# Patient Record
Sex: FEMALE | Race: WHITE | NOT HISPANIC OR LATINO | URBAN - NONMETROPOLITAN AREA
[De-identification: names, ages, dates, MRNs, and addresses within clinical notes are randomized per-mention and may not be internally consistent; named-entity substitution may affect disease eponyms.]

---

## 2021-04-19 ENCOUNTER — OFFICE VISIT (OUTPATIENT)
Dept: URBAN - NONMETROPOLITAN AREA CLINIC 13 | Facility: CLINIC | Age: 76
End: 2021-04-19
Payer: MEDICARE

## 2021-04-19 ENCOUNTER — LAB OUTSIDE AN ENCOUNTER (OUTPATIENT)
Dept: URBAN - NONMETROPOLITAN AREA CLINIC 13 | Facility: CLINIC | Age: 76
End: 2021-04-19

## 2021-04-19 VITALS
SYSTOLIC BLOOD PRESSURE: 121 MMHG | HEART RATE: 69 BPM | HEIGHT: 63 IN | BODY MASS INDEX: 27.11 KG/M2 | DIASTOLIC BLOOD PRESSURE: 74 MMHG | WEIGHT: 153 LBS

## 2021-04-19 DIAGNOSIS — Z80.0 FAMILY HISTORY OF COLON CANCER: ICD-10-CM

## 2021-04-19 DIAGNOSIS — R10.13 EPIGASTRIC ABDOMINAL PAIN: ICD-10-CM

## 2021-04-19 DIAGNOSIS — K57.90 DIVERTICULOSIS: ICD-10-CM

## 2021-04-19 PROCEDURE — 99204 OFFICE O/P NEW MOD 45 MIN: CPT | Performed by: NURSE PRACTITIONER

## 2021-04-19 RX ORDER — ASPIRIN 81 MG/1
TAKE 1 TABLET (81 MG) BY ORAL ROUTE ONCE DAILY TABLET, COATED ORAL 1
Qty: 0 | Refills: 0 | Status: ACTIVE | COMMUNITY
Start: 1900-01-01

## 2021-04-19 RX ORDER — METRONIDAZOLE 500 MG/1
1 TABLET TABLET, FILM COATED ORAL THREE TIMES A DAY
Qty: 42 TABLET | Refills: 0 | OUTPATIENT
Start: 2021-04-19 | End: 2021-05-03

## 2021-04-19 RX ORDER — GLUC/MSM/COLGN2/HYAL/ANTIARTH3 375-375-20
TAKE 1 CAPSULE BY ORAL ROUTE DAILY TABLET ORAL 1
Qty: 0 | Refills: 0 | Status: ACTIVE | COMMUNITY
Start: 1900-01-01

## 2021-04-19 RX ORDER — UBIDECARENONE 30 MG
TAKE 2 TABLETS BY ORAL ROUTE DAILY CAPSULE ORAL 1
Qty: 0 | Refills: 0 | Status: ACTIVE | COMMUNITY
Start: 1900-01-01

## 2021-04-19 RX ORDER — SODIUM, POTASSIUM,MAG SULFATES 17.5-3.13G
354ML SOLUTION, RECONSTITUTED, ORAL ORAL
Qty: 354 MILLILITER | Refills: 0 | OUTPATIENT
Start: 2021-04-19 | End: 2021-04-20

## 2021-04-19 RX ORDER — CIPROFLOXACIN HYDROCHLORIDE 500 MG/1
1 TABLET TABLET, FILM COATED ORAL
Qty: 28 TABLET | Refills: 0 | OUTPATIENT
Start: 2021-04-19 | End: 2021-05-03

## 2021-04-19 RX ORDER — TIMOLOL MALEATE 5 MG/ML
INSTILL 1 DROP IN AFFECTED EYE DAILY SOLUTION/ DROPS OPHTHALMIC 1
Qty: 1 | Refills: 0 | Status: ACTIVE | COMMUNITY
Start: 1900-01-01

## 2021-04-19 NOTE — HPI-TODAY'S VISIT:
Mrs. Amezquita presents for consultation of diverticulitis per Dr. Duarte Montaño.  A copy of this note will be sent to the referring physician.  M mid March she developed increased constipation and left lower quadrant abdominal pain.  She had a contrasted CT on March 19 with stranding in the sigmoid and colon wall thickening suggestive of acute diverticulitis.  She was treated with antibiotics.  She is now on a bowel regimen with Colace daily along with magnesium with a good bowel movement, she is afraid to add any bulking agents as if she has a formed stool she has a fair amount of discomfort.  Her last colonoscopy was done in 2018 by Dr. Quintanilla with left-sided diverticular disease and one polyp.  During all of this she has had an increase of reflux and epigastric abdominal pain with burning and dyspepsia.  She has been taking some Tums with relief but recurrent symptoms.  Today she is doing somewhat better but still not feeling well.  MB

## 2021-06-09 ENCOUNTER — OFFICE VISIT (OUTPATIENT)
Dept: URBAN - NONMETROPOLITAN AREA SURGERY CENTER 1 | Facility: SURGERY CENTER | Age: 76
End: 2021-06-09
Payer: MEDICARE

## 2021-06-09 ENCOUNTER — CLAIMS CREATED FROM THE CLAIM WINDOW (OUTPATIENT)
Dept: URBAN - METROPOLITAN AREA CLINIC 4 | Facility: CLINIC | Age: 76
End: 2021-06-09
Payer: MEDICARE

## 2021-06-09 DIAGNOSIS — Z86.010 H/O ADENOMATOUS POLYP OF COLON: ICD-10-CM

## 2021-06-09 DIAGNOSIS — K31.89 MESENTEROAXIAL GASTRIC VOLVULUS: ICD-10-CM

## 2021-06-09 DIAGNOSIS — K31.89 ACQUIRED DEFORMITY OF DUODENUM: ICD-10-CM

## 2021-06-09 DIAGNOSIS — K21.9 ACID REFLUX: ICD-10-CM

## 2021-06-09 DIAGNOSIS — K21.9 GASTRO-ESOPHAGEAL REFLUX DISEASE WITHOUT ESOPHAGITIS: ICD-10-CM

## 2021-06-09 PROCEDURE — G8907 PT DOC NO EVENTS ON DISCHARG: HCPCS | Performed by: INTERNAL MEDICINE

## 2021-06-09 PROCEDURE — 88312 SPECIAL STAINS GROUP 1: CPT | Performed by: PATHOLOGY

## 2021-06-09 PROCEDURE — 88305 TISSUE EXAM BY PATHOLOGIST: CPT | Performed by: PATHOLOGY

## 2021-06-09 PROCEDURE — 43239 EGD BIOPSY SINGLE/MULTIPLE: CPT | Performed by: INTERNAL MEDICINE

## 2021-06-09 PROCEDURE — G0105 COLORECTAL SCRN; HI RISK IND: HCPCS | Performed by: INTERNAL MEDICINE

## 2021-07-26 ENCOUNTER — WEB ENCOUNTER (OUTPATIENT)
Dept: URBAN - NONMETROPOLITAN AREA CLINIC 2 | Facility: CLINIC | Age: 76
End: 2021-07-26

## 2021-07-26 ENCOUNTER — CLAIMS CREATED FROM THE CLAIM WINDOW (OUTPATIENT)
Dept: URBAN - NONMETROPOLITAN AREA CLINIC 2 | Facility: CLINIC | Age: 76
End: 2021-07-26
Payer: MEDICARE

## 2021-07-26 VITALS
BODY MASS INDEX: 27.11 KG/M2 | SYSTOLIC BLOOD PRESSURE: 119 MMHG | HEART RATE: 59 BPM | WEIGHT: 153 LBS | DIASTOLIC BLOOD PRESSURE: 75 MMHG | HEIGHT: 63 IN | TEMPERATURE: 97.6 F

## 2021-07-26 DIAGNOSIS — Z12.11 COLON CANCER SCREENING: ICD-10-CM

## 2021-07-26 DIAGNOSIS — R10.13 EPIGASTRIC ABDOMINAL PAIN: ICD-10-CM

## 2021-07-26 DIAGNOSIS — Z80.0 FAMILY HISTORY OF COLON CANCER: ICD-10-CM

## 2021-07-26 DIAGNOSIS — K57.90 DIVERTICULOSIS: ICD-10-CM

## 2021-07-26 DIAGNOSIS — K57.50 DIVERTICULOSIS OF BOTH SMALL AND LARGE INTESTINE: ICD-10-CM

## 2021-07-26 PROCEDURE — 99214 OFFICE O/P EST MOD 30 MIN: CPT | Performed by: INTERNAL MEDICINE

## 2021-07-26 RX ORDER — GLUC/MSM/COLGN2/HYAL/ANTIARTH3 375-375-20
TAKE 1 CAPSULE BY ORAL ROUTE DAILY TABLET ORAL 1
Qty: 0 | Refills: 0 | Status: ACTIVE | COMMUNITY
Start: 1900-01-01

## 2021-07-26 RX ORDER — ASPIRIN 81 MG/1
TAKE 1 TABLET (81 MG) BY ORAL ROUTE ONCE DAILY TABLET, COATED ORAL 1
Qty: 0 | Refills: 0 | Status: ACTIVE | COMMUNITY
Start: 1900-01-01

## 2021-07-26 RX ORDER — FAMOTIDINE 20 MG/1
1 TAB QD TABLET ORAL QD
Qty: 90 TABLET | Refills: 3 | OUTPATIENT
Start: 2021-07-26

## 2021-07-26 RX ORDER — UBIDECARENONE 30 MG
TAKE 2 TABLETS BY ORAL ROUTE DAILY CAPSULE ORAL 1
Qty: 0 | Refills: 0 | Status: ACTIVE | COMMUNITY
Start: 1900-01-01

## 2021-07-26 RX ORDER — ONDANSETRON 4 MG/1
1 TABLET ON THE TONGUE AND ALLOW TO DISSOLVE TABLET, ORALLY DISINTEGRATING ORAL
Qty: 60 | Refills: 2 | OUTPATIENT
Start: 2021-07-26

## 2021-07-26 RX ORDER — PANTOPRAZOLE SODIUM 40 MG/1
TAKE 1 TABLET (40 MG) BY ORAL ROUTE ONCE DAILY TABLET, DELAYED RELEASE ORAL ONCE A DAY
Qty: 90 TABLET | Refills: 3 | OUTPATIENT
Start: 2021-07-26

## 2021-07-26 RX ORDER — METRONIDAZOLE 500 MG/1
1 TABLET TABLET, FILM COATED ORAL THREE TIMES A DAY
Qty: 42 TABLET | Refills: 0 | OUTPATIENT
Start: 2021-07-26 | End: 2021-08-09

## 2021-07-26 RX ORDER — TIMOLOL MALEATE 5 MG/ML
INSTILL 1 DROP IN AFFECTED EYE DAILY SOLUTION/ DROPS OPHTHALMIC 1
Qty: 1 | Refills: 0 | Status: ACTIVE | COMMUNITY
Start: 1900-01-01

## 2021-07-26 RX ORDER — CIPROFLOXACIN HYDROCHLORIDE 500 MG/1
1 TABLET TABLET, FILM COATED ORAL
Qty: 28 TABLET | Refills: 0 | OUTPATIENT
Start: 2021-07-26 | End: 2021-08-09

## 2021-07-26 NOTE — HPI-TODAY'S VISIT:
Mrs. Amezquita presents for consultation of diverticulitis per Dr. Duarte Montaño.  A copy of this note will be sent to the referring physician.  M mid March she developed increased constipation and left lower quadrant abdominal pain.  She had a contrasted CT on March 19 with stranding in the sigmoid and colon wall thickening suggestive of acute diverticulitis.  She was treated with antibiotics.  She is now on a bowel regimen with Colace daily along with magnesium with a good bowel movement, she is afraid to add any bulking agents as if she has a formed stool she has a fair amount of discomfort.  Her last colonoscopy was done in 2018 by Dr. Quintanilla with left-sided diverticular disease and one polyp.  During all of this she has had an increase of reflux and epigastric abdominal pain with burning and dyspepsia.  She has been taking some Tums with relief but recurrent symptoms.  Today she is doing somewhat better but still not feeling well.  MB  7/26/2021 Mrs. Amezquita presents for endoscopy and colonoscopy follow-up.  Her EGD reveals mild reflux and gastritis, her colonoscopy reveals left-sided diverticular disease.  She continues to have indigestion and nausea.  She started Prevacid over-the-counter which helps somewhat but she continues to have breakthrough in the evening.  She is also had an increased flare of left lower quadrant abdominal pain over the past week and feels that she is having another diverticulitis flare.  She is tender to the touch and having loose stools.  We still do not have the CT scan done in March from [facility] but we will plan to get that today.  She has not had any lab work since her original flare in March.  She tolerated Flagyl and Cipro well.  Today she feels like she is having a recurrent diverticulitis flare.  MB

## 2021-07-27 LAB
A/G RATIO: 1.7
ALBUMIN: 4.5
ALKALINE PHOSPHATASE: 69
ALT (SGPT): 15
AST (SGOT): 25
BASO (ABSOLUTE): 0
BASOS: 1
BILIRUBIN, TOTAL: 0.4
BUN/CREATININE RATIO: 15
BUN: 14
C-REACTIVE PROTEIN, QUANT: 3
CALCIUM: 9.9
CARBON DIOXIDE, TOTAL: 27
CHLORIDE: 101
CREATININE: 0.92
EGFR IF AFRICN AM: 71
EGFR IF NONAFRICN AM: 62
EOS (ABSOLUTE): 0.4
EOS: 7
GLOBULIN, TOTAL: 2.6
GLUCOSE: 97
HEMATOCRIT: 42
HEMATOLOGY COMMENTS:: (no result)
HEMOGLOBIN: 13.7
IMMATURE CELLS: (no result)
IMMATURE GRANS (ABS): 0
IMMATURE GRANULOCYTES: 0
LYMPHS (ABSOLUTE): 1.6
LYMPHS: 29
MCH: 30.9
MCHC: 32.6
MCV: 95
MONOCYTES(ABSOLUTE): 0.6
MONOCYTES: 11
NEUTROPHILS (ABSOLUTE): 2.9
NEUTROPHILS: 52
NRBC: (no result)
PLATELETS: 217
POTASSIUM: 4.9
PROTEIN, TOTAL: 7.1
RBC: 4.43
RDW: 12.7
SEDIMENTATION RATE-WESTERGREN: 26
SODIUM: 140
WBC: 5.5

## 2021-09-26 ENCOUNTER — P2P PATIENT RECORD (OUTPATIENT)
Age: 76
End: 2021-09-26

## 2021-10-26 ENCOUNTER — OFFICE VISIT (OUTPATIENT)
Dept: URBAN - NONMETROPOLITAN AREA CLINIC 2 | Facility: CLINIC | Age: 76
End: 2021-10-26
Payer: MEDICARE

## 2021-10-26 DIAGNOSIS — R10.13 EPIGASTRIC ABDOMINAL PAIN: ICD-10-CM

## 2021-10-26 DIAGNOSIS — Z80.0 FAMILY HISTORY OF COLON CANCER: ICD-10-CM

## 2021-10-26 DIAGNOSIS — K57.90 DIVERTICULOSIS: ICD-10-CM

## 2021-10-26 DIAGNOSIS — Z12.11 COLON CANCER SCREENING: ICD-10-CM

## 2021-10-26 PROCEDURE — 99214 OFFICE O/P EST MOD 30 MIN: CPT | Performed by: INTERNAL MEDICINE

## 2021-10-26 RX ORDER — UBIDECARENONE 30 MG
TAKE 2 TABLETS BY ORAL ROUTE DAILY CAPSULE ORAL 1
Qty: 0 | Refills: 0 | Status: ACTIVE | COMMUNITY
Start: 1900-01-01

## 2021-10-26 RX ORDER — GLUC/MSM/COLGN2/HYAL/ANTIARTH3 375-375-20
TAKE 1 CAPSULE BY ORAL ROUTE DAILY TABLET ORAL 1
Qty: 0 | Refills: 0 | Status: ACTIVE | COMMUNITY
Start: 1900-01-01

## 2021-10-26 RX ORDER — ONDANSETRON 4 MG/1
1 TABLET ON THE TONGUE AND ALLOW TO DISSOLVE TABLET, ORALLY DISINTEGRATING ORAL
Qty: 60 | Refills: 2 | Status: ACTIVE | COMMUNITY
Start: 2021-07-26

## 2021-10-26 RX ORDER — PANTOPRAZOLE SODIUM 40 MG/1
TAKE 1 TABLET (40 MG) BY ORAL ROUTE ONCE DAILY TABLET, DELAYED RELEASE ORAL ONCE A DAY
Qty: 90 TABLET | Refills: 3 | Status: ACTIVE | COMMUNITY
Start: 2021-07-26

## 2021-10-26 RX ORDER — FAMOTIDINE 20 MG/1
1 TAB QD TABLET ORAL QD
Qty: 90 TABLET | Refills: 3 | Status: ACTIVE | COMMUNITY
Start: 2021-07-26

## 2021-10-26 RX ORDER — ASPIRIN 81 MG/1
TAKE 1 TABLET (81 MG) BY ORAL ROUTE ONCE DAILY TABLET, COATED ORAL 1
Qty: 0 | Refills: 0 | Status: ACTIVE | COMMUNITY
Start: 1900-01-01

## 2021-10-26 RX ORDER — FAMOTIDINE 20 MG/1
1 TAB QD TABLET ORAL QD
Qty: 90 TABLET | Refills: 3
Start: 2021-07-26

## 2021-10-26 RX ORDER — TIMOLOL MALEATE 5 MG/ML
INSTILL 1 DROP IN AFFECTED EYE DAILY SOLUTION/ DROPS OPHTHALMIC 1
Qty: 1 | Refills: 0 | Status: ACTIVE | COMMUNITY
Start: 1900-01-01

## 2021-10-26 RX ORDER — PANTOPRAZOLE SODIUM 40 MG/1
TAKE 1 TABLET (40 MG) BY ORAL ROUTE ONCE DAILY TABLET, DELAYED RELEASE ORAL ONCE A DAY
Qty: 90 TABLET | Refills: 3
Start: 2021-07-26

## 2021-10-26 NOTE — HPI-TODAY'S VISIT:
Mrs. Amezquita presents for consultation of diverticulitis per Dr. Duarte Montaño.  A copy of this note will be sent to the referring physician.  M mid March she developed increased constipation and left lower quadrant abdominal pain.  She had a contrasted CT on March 19 with stranding in the sigmoid and colon wall thickening suggestive of acute diverticulitis.  She was treated with antibiotics.  She is now on a bowel regimen with Colace daily along with magnesium with a good bowel movement, she is afraid to add any bulking agents as if she has a formed stool she has a fair amount of discomfort.  Her last colonoscopy was done in 2018 by Dr. Quintanilla with left-sided diverticular disease and one polyp.  During all of this she has had an increase of reflux and epigastric abdominal pain with burning and dyspepsia.  She has been taking some Tums with relief but recurrent symptoms.  Today she is doing somewhat better but still not feeling well.  MB  7/26/2021 Mrs. Amezquita presents for endoscopy and colonoscopy follow-up.  Her EGD reveals mild reflux and gastritis, her colonoscopy reveals left-sided diverticular disease.  She continues to have indigestion and nausea.  She started Prevacid over-the-counter which helps somewhat but she continues to have breakthrough in the evening.  She is also had an increased flare of left lower quadrant abdominal pain over the past week and feels that she is having another diverticulitis flare.  She is tender to the touch and having loose stools.  We still do not have the CT scan done in March from [facility] but we will plan to get that today.  She has not had any lab work since her original flare in March.  She tolerated Flagyl and Cipro well.  Today she feels like she is having a recurrent diverticulitis flare.  MB 10/26/2021 The patient presents today for follow-up of her reflux, gastritis, and diverticulosis.  Since her last visit, she has been doing quite well on pantoprazole 40 mg daily and Pepcid 20 mg at night.  She does not have any further breakthrough reflux or epigastric pain.  She has not had any further bouts of diverticulitis.  She is taking a stool softener daily, and she is usually having 1-2 bowel daily.  We have discussed adding back in Metamucil to her diet, and she is willing to do so today.  Overall, from a GI standpoint, things are stable.  She does think she can wean off the Pepcid and only take it as needed.

## 2022-04-26 ENCOUNTER — OFFICE VISIT (OUTPATIENT)
Dept: URBAN - NONMETROPOLITAN AREA CLINIC 2 | Facility: CLINIC | Age: 77
End: 2022-04-26

## 2022-08-30 ENCOUNTER — DASHBOARD ENCOUNTERS (OUTPATIENT)
Age: 77
End: 2022-08-30

## 2022-08-30 ENCOUNTER — OFFICE VISIT (OUTPATIENT)
Dept: URBAN - NONMETROPOLITAN AREA CLINIC 2 | Facility: CLINIC | Age: 77
End: 2022-08-30
Payer: MEDICARE

## 2022-08-30 ENCOUNTER — WEB ENCOUNTER (OUTPATIENT)
Dept: URBAN - NONMETROPOLITAN AREA CLINIC 2 | Facility: CLINIC | Age: 77
End: 2022-08-30

## 2022-08-30 VITALS
BODY MASS INDEX: 27.46 KG/M2 | TEMPERATURE: 97 F | HEART RATE: 58 BPM | WEIGHT: 155 LBS | DIASTOLIC BLOOD PRESSURE: 77 MMHG | SYSTOLIC BLOOD PRESSURE: 127 MMHG | HEIGHT: 63 IN

## 2022-08-30 DIAGNOSIS — Z12.11 COLON CANCER SCREENING: ICD-10-CM

## 2022-08-30 DIAGNOSIS — K57.90 DIVERTICULOSIS: ICD-10-CM

## 2022-08-30 DIAGNOSIS — Z80.0 FAMILY HISTORY OF COLON CANCER: ICD-10-CM

## 2022-08-30 DIAGNOSIS — R10.13 EPIGASTRIC ABDOMINAL PAIN: ICD-10-CM

## 2022-08-30 PROBLEM — 79922009: Status: ACTIVE | Noted: 2021-04-19

## 2022-08-30 PROBLEM — 397881000: Status: ACTIVE | Noted: 2021-04-19

## 2022-08-30 PROBLEM — 305058001: Status: ACTIVE | Noted: 2021-07-26

## 2022-08-30 PROCEDURE — 99213 OFFICE O/P EST LOW 20 MIN: CPT | Performed by: INTERNAL MEDICINE

## 2022-08-30 RX ORDER — ASPIRIN 81 MG/1
TAKE 1 TABLET (81 MG) BY ORAL ROUTE ONCE DAILY TABLET, COATED ORAL 1
Qty: 0 | Refills: 0 | Status: ACTIVE | COMMUNITY
Start: 1900-01-01

## 2022-08-30 RX ORDER — ONDANSETRON 4 MG/1
1 TABLET ON THE TONGUE AND ALLOW TO DISSOLVE TABLET, ORALLY DISINTEGRATING ORAL
Qty: 60 | Refills: 2 | Status: ACTIVE | COMMUNITY
Start: 2021-07-26

## 2022-08-30 RX ORDER — TIMOLOL MALEATE 5 MG/ML
INSTILL 1 DROP IN AFFECTED EYE DAILY SOLUTION/ DROPS OPHTHALMIC 1
Qty: 1 | Refills: 0 | Status: ACTIVE | COMMUNITY
Start: 1900-01-01

## 2022-08-30 RX ORDER — UBIDECARENONE 30 MG
TAKE 2 TABLETS BY ORAL ROUTE DAILY CAPSULE ORAL 1
Qty: 0 | Refills: 0 | Status: ACTIVE | COMMUNITY
Start: 1900-01-01

## 2022-08-30 RX ORDER — FAMOTIDINE 20 MG/1
1 TAB QD TABLET ORAL QD
Qty: 90 TABLET | Refills: 3 | Status: ACTIVE | COMMUNITY
Start: 2021-07-26

## 2022-08-30 RX ORDER — PANTOPRAZOLE SODIUM 40 MG/1
TAKE 1 TABLET (40 MG) BY ORAL ROUTE ONCE DAILY TABLET, DELAYED RELEASE ORAL ONCE A DAY
Qty: 90 TABLET | Refills: 3 | Status: ACTIVE | COMMUNITY
Start: 2021-07-26

## 2022-08-30 RX ORDER — GLUC/MSM/COLGN2/HYAL/ANTIARTH3 375-375-20
TAKE 1 CAPSULE BY ORAL ROUTE DAILY TABLET ORAL 1
Qty: 0 | Refills: 0 | Status: ACTIVE | COMMUNITY
Start: 1900-01-01

## 2022-08-30 NOTE — HPI-TODAY'S VISIT:
Mrs. Amezquita presents for consultation of diverticulitis per Dr. Duarte Montaño.  A copy of this note will be sent to the referring physician.  M mid March she developed increased constipation and left lower quadrant abdominal pain.  She had a contrasted CT on March 19 with stranding in the sigmoid and colon wall thickening suggestive of acute diverticulitis.  She was treated with antibiotics.  She is now on a bowel regimen with Colace daily along with magnesium with a good bowel movement, she is afraid to add any bulking agents as if she has a formed stool she has a fair amount of discomfort.  Her last colonoscopy was done in 2018 by Dr. Quintanilla with left-sided diverticular disease and one polyp.  During all of this she has had an increase of reflux and epigastric abdominal pain with burning and dyspepsia.  She has been taking some Tums with relief but recurrent symptoms.  Today she is doing somewhat better but still not feeling well.  MB  7/26/2021 Mrs. Amezquita presents for endoscopy and colonoscopy follow-up.  Her EGD reveals mild reflux and gastritis, her colonoscopy reveals left-sided diverticular disease.  She continues to have indigestion and nausea.  She started Prevacid over-the-counter which helps somewhat but she continues to have breakthrough in the evening.  She is also had an increased flare of left lower quadrant abdominal pain over the past week and feels that she is having another diverticulitis flare.  She is tender to the touch and having loose stools.  We still do not have the CT scan done in March from [facility] but we will plan to get that today.  She has not had any lab work since her original flare in March.  She tolerated Flagyl and Cipro well.  Today she feels like she is having a recurrent diverticulitis flare.  MB 10/26/2021 The patient presents today for follow-up of her reflux, gastritis, and diverticulosis.  Since her last visit, she has been doing quite well on pantoprazole 40 mg daily and Pepcid 20 mg at night.  She does not have any further breakthrough reflux or epigastric pain.  She has not had any further bouts of diverticulitis.  She is taking a stool softener daily, and she is usually having 1-2 bowel daily.  We have discussed adding back in Metamucil to her diet, and she is willing to do so today.  Overall, from a GI standpoint, things are stable.  She does think she can wean off the Pepcid and only take it as needed. 8/30/2022 The patient presents today for follow-up of her history of diverticulosis and epigastric abdominal pain with reflux.  Since her last visit, she has weaned herself off of Protonix.  She is only taking it when she needs it.  This is usually about once every other week.  Her bowels have been regular.  She continues to take her Metamucil and stool softener.  She has not had any further bouts of diverticulitis.  She is due for repeat colonoscopy in 2026 due to her family history of colon cancer.  Overall, from a GI standpoint, she is doing quite well today.

## 2023-11-21 NOTE — PHYSICAL EXAM NEUROLOGIC:
I am glad to check STD panel. Discussed about safe sex. oriented to person, place and time ,  , cranial nerves 2-12 grossly intact